# Patient Record
(demographics unavailable — no encounter records)

---

## 2025-04-07 NOTE — HISTORY OF PRESENT ILLNESS
[de-identified] : Presents for BP check, labs, and general follow-up.  Very frustrated and tearful about inability to lose weight (in fact note weight gain); states insurance did not approve either Wegovy or Zepbound.  Discussed the [facility] Weight Management Team - pt is amenable for a referral.

## 2025-05-21 NOTE — CONSULT LETTER
[Dear  ___] : Dear  [unfilled], [Courtesy Letter:] : I had the pleasure of seeing your patient, [unfilled], in my office today. [Please see my note below.] : Please see my note below. [Consult Closing:] : Thank you very much for allowing me to participate in the care of this patient.  If you have any questions, please do not hesitate to contact me. [Sincerely,] : Sincerely, [FreeTextEntry3] : Vladimir Mora MD, FACS, [facility], Dept of Surgery, [facility] [address] [address] P [phone] F [phone]

## 2025-05-21 NOTE — HISTORY OF PRESENT ILLNESS
[Procedure: ___] : Procedure performed: [unfilled]  [Date of Surgery: ___] : Date of Surgery:   [unfilled] [Surgeon Name:   ___] : Surgeon Name: Dr. FAUSTIN [de-identified] : Revision of Lap Band to APS (Prolapse) + repair of hiatal hernia - 8/2/2012 - Mary Jo

## 2025-05-21 NOTE — REASON FOR VISIT
[Initial Consult] : an initial consult for [Morbid Obesity (BMI>40)] : morbid obesity (bmi>40) [S/P Bariatric Surgery] : s/p bariatric surgery [Band Adjustment] : band adjustment

## 2025-05-21 NOTE — ASSESSMENT
[FreeTextEntry1] : 44-year-old morbidly obese woman experiencing significant weight regain.  History of laparoscopic adjustable gastric band in 2003 which was revised in 2012 due to prolapse and hiatal hernia.  Last seen in the practice in 2014.  Her weight at that time was 227 pounds with a body mass index of 36.6 kg/m.  Patient returns today at 323 pounds with a body mass index of 53.04 kg/m  She denies any adverse events or symptoms.  No significant reflux, dysphagia, food impactions or intolerance.  Has been experiencing some mid epigastric discomfort and was concerned she has developed a recurrent hernia.  Video esophagram was performed today which demonstrates normal orientation and positioning of her Lap-Band without signs of obstruction.  No significant reflux is noted.  No obvious hiatal hernia is seen.  Abdominal exam aside from morbid obesity remains grossly unremarkable.  Lap-Band port is easily palpable in the left hemiabdomen.  Contributing factors to the patient's weight gain includes chronic depression.  She does report regular follow-up and evaluations with a psychologist.  Currently takes Seroquel as well as Lamictal.  Most notably however includes her sedentary lifestyle, lack of exercise and poor dietary choices.  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  Nutritional and weight loss counseling has been provided. The importance of weight reduction in the treatment of Obesity and its contribution to resolution of obesity related comorbidities has been discussed.  The patient is encouraged to remain calorie conscious and continue a low fat, low carbohydrate, high protein diet. Eat slowly and chew food well.  Avoid eating and drinking simultaneously.  Also, emphasis has been placed on the importance of adequate hydration, multi-vitamin supplementation and exercise.  (15 min)  Lap-Band adjustment has been offered and provided to ensure functionality of the Lap-Band.  LAP-BAND adjustment was performed today. Using standard sterile technique, a 20-gauge [place] needle was inserted into the LAP-BAND port.  This was accessed with single puncture.  4 cc were aspirated and 1 cc added. The resulting net volume remains 5 cc.  The patient was seated erect and tolerated water test without symptoms of reflux, dysphagia or regurgitation.  Routine post adjustment nutritional counseling was provided. This patient should adhere to a liquid diet for the next 24-48 hours advancing one stage per day to regular as tolerated.  I have briefly discussed with the patient potential revisional surgery including conversion of her Lap-Band to a sleeve gastrectomy or Mitch-en-Y gastric bypass.  Given her current BMI I would more likely recommend a gastric bypass or sleeve gastrectomy to help her achieve effective weight loss.  This of course would carry unfortunately a mildly higher risk of nutritional challenges and deficiencies in the long-term as well as potential for other bypass complications such as marginal ulcers and/or internal hernias.  History of hiatal hernia, band revision may make a sleeve gastrectomy more problematic.    Unfortunately the patient reports having tried to obtain GLP-1 agonists which were denied by her insurance company.  Presently however she has no desire to pursues further surgical intervention.  She has however acknowledged the fact that she certainly needs to lose weight and will continue to make appropriate dietary and behavioral modifications in effort to achieve successful weight loss.  Should she change her mind and desire to pursue surgery I will remain available to help  as well as steer her towards the appropriate procedure.  I have recommended she schedule an appointment to meet with her bariatric nutritionist for ongoing counseling and meal planning and efforts for weight loss.  Follow-up with me in 2 months

## 2025-06-12 NOTE — PHYSICAL EXAM
[No Acute Distress] : no acute distress [PERRL] : pupils equal round and reactive to light [EOMI] : extraocular movements intact [Normal Oropharynx] : the oropharynx was normal [Supple] : supple [Clear to Auscultation] : lungs were clear to auscultation bilaterally [Normal S1, S2] : normal S1 and S2 [Soft] : abdomen soft [Non Tender] : non-tender [Normal Bowel Sounds] : normal bowel sounds [No Rash] : no rash [Normal Gait] : normal gait [Normal Affect] : the affect was normal [de-identified] : obese [de-identified] : edema b/l lower extremity, and left upper extremity

## 2025-06-12 NOTE — HISTORY OF PRESENT ILLNESS
[Post-hospitalization from ___ Hospital] : Post-hospitalization from [unfilled] Hospital [Admitted on: ___] : The patient was admitted on [unfilled] [Discharged on ___] : discharged on [unfilled] [Discharge Summary] : discharge summary [Discharge Med List] : discharge medication list [Pertinent Labs] : pertinent labs [Radiology Findings] : radiology findings [Med Reconciliation] : medication reconciliation has been completed [FreeTextEntry2] : Ms. Corinne Kennedy is a 43 yo female presents today for post discharge follow up. Pt was at [facility] for concerns for swelling in legs, and arms. History was obtained from pt and from [facility] as below:  Hospital Course 45 y/o F presents with 1 month history of swelling. Dopplers performed. NO DVT in LE. Superficial thrombophlebitis noted. RUE axillary vein DVT noted. Spoke to vascular team via transfer center who recommend ac and no thrombectomy indicated. Patient discharged home with outpt follow up with hematology and rheumatology.  Interval Change: Pt reports still swelling and also still struggling with weight. Pt seeks today a reorder of medication zepbound which was renewed. Pt emphasized today will need to follow up with hematology and rheumatologist. Pt recommended with chronic edema, b/l recommended a peripheral blood evaluation, referral to vascular also provided. Lastly, pt informed about the incidental hepatic lesions. Will order today a dedication MRI abdomen with contrast and evalaute.

## 2025-06-12 NOTE — ASSESSMENT
[FreeTextEntry1] : Greater than 55 min of face to face time, reviewed discharge notes, hospital labwork, addressed various health concerns, reviewed necessary labs, imaging, advised to follow up with referral.  Answered all pt questions, coordinated care for patient. Stable since discharge from Hospital. referral to vascular provided today advised to also follow up with hematology and rheumatology as per hospital recommendation.  RTO follow up with PMD, accordingly.